# Patient Record
Sex: FEMALE | Race: WHITE | NOT HISPANIC OR LATINO
[De-identification: names, ages, dates, MRNs, and addresses within clinical notes are randomized per-mention and may not be internally consistent; named-entity substitution may affect disease eponyms.]

---

## 2017-02-17 DIAGNOSIS — Z82.49 FAMILY HISTORY OF ISCHEMIC HEART DISEASE AND OTHER DISEASES OF THE CIRCULATORY SYSTEM: ICD-10-CM

## 2017-02-17 DIAGNOSIS — Z86.39 PERSONAL HISTORY OF OTHER ENDOCRINE, NUTRITIONAL AND METABOLIC DISEASE: ICD-10-CM

## 2017-02-17 DIAGNOSIS — Z78.9 OTHER SPECIFIED HEALTH STATUS: ICD-10-CM

## 2017-02-17 DIAGNOSIS — H81.09 MENIERE'S DISEASE, UNSPECIFIED EAR: ICD-10-CM

## 2017-02-17 DIAGNOSIS — Z84.89 FAMILY HISTORY OF OTHER SPECIFIED CONDITIONS: ICD-10-CM

## 2017-02-17 DIAGNOSIS — H81.10 BENIGN PAROXYSMAL VERTIGO, UNSPECIFIED EAR: ICD-10-CM

## 2017-02-17 DIAGNOSIS — Z83.49 FAMILY HISTORY OF OTHER ENDOCRINE, NUTRITIONAL AND METABOLIC DISEASES: ICD-10-CM

## 2017-02-17 DIAGNOSIS — F41.9 ANXIETY DISORDER, UNSPECIFIED: ICD-10-CM

## 2017-02-17 PROBLEM — Z00.00 ENCOUNTER FOR PREVENTIVE HEALTH EXAMINATION: Status: ACTIVE | Noted: 2017-02-17

## 2017-02-17 RX ORDER — PROPRANOLOL HYDROCHLORIDE 10 MG/1
10 TABLET ORAL
Refills: 0 | Status: ACTIVE | COMMUNITY

## 2017-02-17 RX ORDER — OSELTAMIVIR PHOSPHATE 75 MG/1
75 CAPSULE ORAL
Refills: 0 | Status: ACTIVE | COMMUNITY

## 2017-02-22 ENCOUNTER — APPOINTMENT (OUTPATIENT)
Dept: ELECTROPHYSIOLOGY | Facility: CLINIC | Age: 47
End: 2017-02-22

## 2017-06-17 ENCOUNTER — RESULT REVIEW (OUTPATIENT)
Age: 47
End: 2017-06-17

## 2018-03-28 ENCOUNTER — APPOINTMENT (OUTPATIENT)
Dept: ELECTROPHYSIOLOGY | Facility: CLINIC | Age: 48
End: 2018-03-28
Payer: COMMERCIAL

## 2018-03-28 DIAGNOSIS — R00.2 PALPITATIONS: ICD-10-CM

## 2018-03-28 PROCEDURE — 99244 OFF/OP CNSLTJ NEW/EST MOD 40: CPT

## 2018-03-28 PROCEDURE — 93000 ELECTROCARDIOGRAM COMPLETE: CPT

## 2018-03-29 VITALS
DIASTOLIC BLOOD PRESSURE: 70 MMHG | HEART RATE: 71 BPM | HEIGHT: 65 IN | BODY MASS INDEX: 30.66 KG/M2 | WEIGHT: 184 LBS | SYSTOLIC BLOOD PRESSURE: 130 MMHG

## 2018-03-29 PROBLEM — R00.2 PALPITATION: Status: ACTIVE | Noted: 2018-03-29

## 2019-03-06 ENCOUNTER — RESULT REVIEW (OUTPATIENT)
Age: 49
End: 2019-03-06

## 2020-03-12 ENCOUNTER — RESULT REVIEW (OUTPATIENT)
Age: 50
End: 2020-03-12

## 2022-12-06 ENCOUNTER — APPOINTMENT (OUTPATIENT)
Dept: ORTHOPEDIC SURGERY | Facility: CLINIC | Age: 52
End: 2022-12-06

## 2022-12-06 VITALS — HEIGHT: 65 IN | WEIGHT: 184 LBS | BODY MASS INDEX: 30.66 KG/M2

## 2022-12-06 PROCEDURE — 72040 X-RAY EXAM NECK SPINE 2-3 VW: CPT

## 2022-12-06 PROCEDURE — 73030 X-RAY EXAM OF SHOULDER: CPT | Mod: RT

## 2022-12-06 PROCEDURE — 99214 OFFICE O/P EST MOD 30 MIN: CPT

## 2022-12-06 RX ORDER — GABAPENTIN 100 MG/1
100 CAPSULE ORAL
Qty: 60 | Refills: 0 | Status: ACTIVE | COMMUNITY
Start: 2022-12-06 | End: 1900-01-01

## 2022-12-06 NOTE — HISTORY OF PRESENT ILLNESS
[8] : 8 [3] : 3 [Dull/Aching] : dull/aching [Radiating] : radiating [de-identified] : 52 year old RHD F presents with her spouse for R anterior chest wall pain, felt like a pulled muscle, next day was diagnosed with Covid in Sept. Pain is worse in the am. Arm trembles intermittently, radiates to her elbow and hand, tingling is intermittent. No night symptoms. Naprosyn x 10 days 6 weeks ago helped minimally.\par \par pmh: Meniere's, Anxiety\par \par work:  - working remotely [] : no [FreeTextEntry1] : right shoulder [FreeTextEntry5] : Pt states she started feeling pain in her right shoulder since September, no specific injury, no tx to date, her main concern is that she has soreness in her shoulder that its not getting any better.  [FreeTextEntry6] : soreness [FreeTextEntry7] : rt elbow, neck

## 2022-12-06 NOTE — ASSESSMENT
[FreeTextEntry1] : 52F with R brachial plexopathy, cervical spasm\par \par 1) Treatment options reviewed.\par 2) PT\par 3) Trial Gabapentin qhs as reviewed\par 4) Return 4-6 weeks\par

## 2022-12-06 NOTE — PHYSICAL EXAM
[Rotation to left] : rotation to left [Rotation to right] : rotation to right [Right] : right shoulder [Sitting] : sitting [Moderate] : moderate [5 ___] : forward flexion 5[unfilled]/5 [No bony abnormalities] : No bony abnormalities [Straightening consistent with spasm] : Straightening consistent with spasm [There are no fractures, subluxations or dislocations. No significant abnormalities are seen] : There are no fractures, subluxations or dislocations. No significant abnormalities are seen [] : no deformity [FreeTextEntry3] : +/- tenderness sternum/costochondral  [FreeTextEntry9] : \par ER 30\par IR L5

## 2023-01-17 ENCOUNTER — FORM ENCOUNTER (OUTPATIENT)
Age: 53
End: 2023-01-17

## 2023-01-17 ENCOUNTER — APPOINTMENT (OUTPATIENT)
Dept: ORTHOPEDIC SURGERY | Facility: CLINIC | Age: 53
End: 2023-01-17
Payer: COMMERCIAL

## 2023-01-17 VITALS — WEIGHT: 190 LBS | HEIGHT: 65 IN | BODY MASS INDEX: 31.65 KG/M2

## 2023-01-17 DIAGNOSIS — M62.838 OTHER MUSCLE SPASM: ICD-10-CM

## 2023-01-17 PROCEDURE — 99213 OFFICE O/P EST LOW 20 MIN: CPT

## 2023-01-17 NOTE — DISCUSSION/SUMMARY
[de-identified] : Seems to be more of a brachial plexitis, but will start with MRI of cervical spine to r/o disc abnormality

## 2023-01-17 NOTE — PHYSICAL EXAM
[Rotation to left] : rotation to left [Rotation to right] : rotation to right [No bony abnormalities] : No bony abnormalities [Straightening consistent with spasm] : Straightening consistent with spasm [Right] : right shoulder [Sitting] : sitting [Moderate] : moderate [5 ___] : forward flexion 5[unfilled]/5 [There are no fractures, subluxations or dislocations. No significant abnormalities are seen] : There are no fractures, subluxations or dislocations. No significant abnormalities are seen [] : no deformity [FreeTextEntry3] : +/- tenderness sternum/costochondral  [FreeTextEntry9] : \par ER 30\par IR L5

## 2023-01-18 ENCOUNTER — APPOINTMENT (OUTPATIENT)
Dept: MRI IMAGING | Facility: CLINIC | Age: 53
End: 2023-01-18
Payer: COMMERCIAL

## 2023-01-18 PROCEDURE — 72141 MRI NECK SPINE W/O DYE: CPT

## 2023-01-24 ENCOUNTER — APPOINTMENT (OUTPATIENT)
Dept: ORTHOPEDIC SURGERY | Facility: CLINIC | Age: 53
End: 2023-01-24

## 2023-01-31 ENCOUNTER — APPOINTMENT (OUTPATIENT)
Dept: ORTHOPEDIC SURGERY | Facility: CLINIC | Age: 53
End: 2023-01-31
Payer: COMMERCIAL

## 2023-01-31 VITALS — BODY MASS INDEX: 31.65 KG/M2 | HEIGHT: 65 IN | WEIGHT: 190 LBS

## 2023-01-31 DIAGNOSIS — G54.0 BRACHIAL PLEXUS DISORDERS: ICD-10-CM

## 2023-01-31 PROCEDURE — 99213 OFFICE O/P EST LOW 20 MIN: CPT

## 2023-01-31 NOTE — HISTORY OF PRESENT ILLNESS
[8] : 8 [3] : 3 [Burning] : burning [Dull/Aching] : dull/aching [Radiating] : radiating [Frequent] : frequent [de-identified] : 52 year old RHD F presents with her spouse for R anterior chest wall pain, felt like a pulled muscle, next day was diagnosed with Covid in Sept. Pain is worse in the am. Arm trembles intermittently, radiates to her elbow and hand, tingling is intermittent. No night symptoms. Naprosyn x 10 days 6 weeks ago helped minimally.\par \par pmh: Meniere's, Anxiety\par \par work:  - working remotely [] : no [FreeTextEntry1] : right shoulder [FreeTextEntry5] : Pt states she started feeling pain in her right shoulder since September, no specific injury, no tx to date, her main concern is that she has soreness in her shoulder that its not getting any better.  [FreeTextEntry6] : soreness [FreeTextEntry7] : rt elbow, neck  [de-identified] : MRI F/U

## 2023-01-31 NOTE — REASON FOR VISIT
[FreeTextEntry2] : 1/31/23- Had MRI: minimal spondylolisthesis, minimal degenerative changes, no central stenosis, minimal foraminal narrowing on left\par 1/17/23- Still with right sided soreness- chest wall, shoulder to neck on right

## 2023-01-31 NOTE — ASSESSMENT
[FreeTextEntry1] : There is nothing on the scan to account for her right sided nerve symptoms. Suggest she see Neurologist for opinion. Continue PT since it has been helpful

## 2023-11-17 ENCOUNTER — NON-APPOINTMENT (OUTPATIENT)
Age: 53
End: 2023-11-17

## 2023-11-17 ENCOUNTER — APPOINTMENT (OUTPATIENT)
Dept: OPHTHALMOLOGY | Facility: CLINIC | Age: 53
End: 2023-11-17
Payer: COMMERCIAL

## 2023-11-17 PROCEDURE — 99204 OFFICE O/P NEW MOD 45 MIN: CPT

## 2023-11-17 PROCEDURE — 92083 EXTENDED VISUAL FIELD XM: CPT
